# Patient Record
Sex: FEMALE | Race: WHITE | NOT HISPANIC OR LATINO | Employment: STUDENT | URBAN - METROPOLITAN AREA
[De-identification: names, ages, dates, MRNs, and addresses within clinical notes are randomized per-mention and may not be internally consistent; named-entity substitution may affect disease eponyms.]

---

## 2017-08-03 ENCOUNTER — RESOLUTE PROFESSIONAL BILLING HOSPITAL PROF FEE (OUTPATIENT)
Dept: HOSPITALIST | Facility: MEDICAL CENTER | Age: 22
End: 2017-08-03
Payer: MEDICAID

## 2017-08-03 ENCOUNTER — HOSPITAL ENCOUNTER (INPATIENT)
Facility: MEDICAL CENTER | Age: 22
LOS: 1 days | DRG: 152 | End: 2017-08-05
Attending: EMERGENCY MEDICINE | Admitting: HOSPITALIST
Payer: MEDICAID

## 2017-08-03 DIAGNOSIS — D72.829 LEUKOCYTOSIS, UNSPECIFIED TYPE: ICD-10-CM

## 2017-08-03 DIAGNOSIS — J02.9 PHARYNGITIS, UNSPECIFIED ETIOLOGY: ICD-10-CM

## 2017-08-03 DIAGNOSIS — T50.905A MEDICATION REACTION, INITIAL ENCOUNTER: ICD-10-CM

## 2017-08-03 DIAGNOSIS — B34.9 VIREMIA: ICD-10-CM

## 2017-08-03 DIAGNOSIS — F19.10 POLYSUBSTANCE ABUSE (HCC): ICD-10-CM

## 2017-08-03 DIAGNOSIS — F15.10 METHAMPHETAMINE ABUSE (HCC): ICD-10-CM

## 2017-08-03 PROBLEM — B96.89 BACTERIAL PHARYNGITIS: Status: ACTIVE | Noted: 2017-08-03

## 2017-08-03 PROBLEM — J02.8 BACTERIAL PHARYNGITIS: Status: ACTIVE | Noted: 2017-08-03

## 2017-08-03 PROBLEM — E86.0 DEHYDRATION: Status: ACTIVE | Noted: 2017-08-03

## 2017-08-03 LAB
ALBUMIN SERPL BCP-MCNC: 3.3 G/DL (ref 3.2–4.9)
ALBUMIN/GLOB SERPL: 0.8 G/DL
ALP SERPL-CCNC: 88 U/L (ref 30–99)
ALT SERPL-CCNC: 23 U/L (ref 2–50)
AMPHETAMINES UR QL: POSITIVE
ANION GAP SERPL CALC-SCNC: 10 MMOL/L (ref 0–11.9)
APPEARANCE UR: CLEAR
AST SERPL-CCNC: 17 U/L (ref 12–45)
BACTERIA #/AREA URNS HPF: ABNORMAL /HPF
BARBITURATES UR QL SCN: NEGATIVE
BASOPHILS # BLD AUTO: 0.4 % (ref 0–1.8)
BASOPHILS # BLD: 0.07 K/UL (ref 0–0.12)
BENZODIAZ UR QL SCN: NEGATIVE
BILIRUB SERPL-MCNC: 1.4 MG/DL (ref 0.1–1.5)
BILIRUB UR QL STRIP.AUTO: ABNORMAL
BUN SERPL-MCNC: 6 MG/DL (ref 8–22)
BZE UR QL SCN: NEGATIVE
CALCIUM SERPL-MCNC: 8.7 MG/DL (ref 8.4–10.2)
CASTS URNS QL MICRO: ABNORMAL /LPF
CHLORIDE SERPL-SCNC: 97 MMOL/L (ref 96–112)
CO2 SERPL-SCNC: 25 MMOL/L (ref 20–33)
COLOR UR: YELLOW
CREAT SERPL-MCNC: 0.71 MG/DL (ref 0.5–1.4)
CULTURE IF INDICATED INDCX: YES UA CULTURE
EOSINOPHIL # BLD AUTO: 0.01 K/UL (ref 0–0.51)
EOSINOPHIL NFR BLD: 0.1 % (ref 0–6.9)
EPI CELLS #/AREA URNS HPF: ABNORMAL /HPF
ERYTHROCYTE [DISTWIDTH] IN BLOOD BY AUTOMATED COUNT: 39.9 FL (ref 35.9–50)
GFR SERPL CREATININE-BSD FRML MDRD: >60 ML/MIN/1.73 M 2
GLOBULIN SER CALC-MCNC: 4 G/DL (ref 1.9–3.5)
GLUCOSE SERPL-MCNC: 101 MG/DL (ref 65–99)
GLUCOSE UR STRIP.AUTO-MCNC: 100 MG/DL
HCG UR QL: NEGATIVE
HCT VFR BLD AUTO: 43.7 % (ref 37–47)
HGB BLD-MCNC: 15.7 G/DL (ref 12–16)
IMM GRANULOCYTES # BLD AUTO: 0.09 K/UL (ref 0–0.11)
IMM GRANULOCYTES NFR BLD AUTO: 0.5 % (ref 0–0.9)
KETONES UR STRIP.AUTO-MCNC: NEGATIVE MG/DL
LACTATE BLD-SCNC: 1.49 MMOL/L (ref 0.5–2)
LEUKOCYTE ESTERASE UR QL STRIP.AUTO: ABNORMAL
LYMPHOCYTES # BLD AUTO: 1.47 K/UL (ref 1–4.8)
LYMPHOCYTES NFR BLD: 9 % (ref 22–41)
MCH RBC QN AUTO: 31.7 PG (ref 27–33)
MCHC RBC AUTO-ENTMCNC: 35.9 G/DL (ref 33.6–35)
MCV RBC AUTO: 88.3 FL (ref 81.4–97.8)
MICRO URNS: ABNORMAL
MONOCYTES # BLD AUTO: 1.55 K/UL (ref 0–0.85)
MONOCYTES NFR BLD AUTO: 9.5 % (ref 0–13.4)
MUCOUS THREADS #/AREA URNS HPF: ABNORMAL /HPF
NEUTROPHILS # BLD AUTO: 13.21 K/UL (ref 2–7.15)
NEUTROPHILS NFR BLD: 80.5 % (ref 44–72)
NITRITE UR QL STRIP.AUTO: NEGATIVE
NRBC # BLD AUTO: 0 K/UL
NRBC BLD AUTO-RTO: 0 /100 WBC
PCP UR QL SCN: NEGATIVE
PH UR STRIP.AUTO: 8 [PH]
PLATELET # BLD AUTO: 214 K/UL (ref 164–446)
PMV BLD AUTO: 9.9 FL (ref 9–12.9)
POTASSIUM SERPL-SCNC: 3.4 MMOL/L (ref 3.6–5.5)
PROT SERPL-MCNC: 7.3 G/DL (ref 6–8.2)
PROT UR QL STRIP: 100 MG/DL
RBC # BLD AUTO: 4.95 M/UL (ref 4.2–5.4)
RBC # URNS HPF: ABNORMAL /HPF
RBC UR QL AUTO: NEGATIVE
SODIUM SERPL-SCNC: 132 MMOL/L (ref 135–145)
SP GR UR REFRACTOMETRY: 1.02
SP GR UR STRIP.AUTO: 1.01
SPECIMEN DRAWN FROM PATIENT: NORMAL
UR OPIATES 2659: POSITIVE
UR THC 2511T: POSITIVE
UR TRICYCLIC 2660: NEGATIVE
WBC # BLD AUTO: 16.4 K/UL (ref 4.8–10.8)
WBC #/AREA URNS HPF: ABNORMAL /HPF

## 2017-08-03 PROCEDURE — 87077 CULTURE AEROBIC IDENTIFY: CPT

## 2017-08-03 PROCEDURE — 700105 HCHG RX REV CODE 258: Performed by: HOSPITALIST

## 2017-08-03 PROCEDURE — 700105 HCHG RX REV CODE 258

## 2017-08-03 PROCEDURE — 81001 URINALYSIS AUTO W/SCOPE: CPT | Mod: 59

## 2017-08-03 PROCEDURE — 87040 BLOOD CULTURE FOR BACTERIA: CPT | Mod: 59

## 2017-08-03 PROCEDURE — G0378 HOSPITAL OBSERVATION PER HR: HCPCS

## 2017-08-03 PROCEDURE — 83605 ASSAY OF LACTIC ACID: CPT

## 2017-08-03 PROCEDURE — 700111 HCHG RX REV CODE 636 W/ 250 OVERRIDE (IP)

## 2017-08-03 PROCEDURE — 304562 HCHG STAT O2 MASK/CANNULA

## 2017-08-03 PROCEDURE — 81025 URINE PREGNANCY TEST: CPT

## 2017-08-03 PROCEDURE — 304561 HCHG STAT O2

## 2017-08-03 PROCEDURE — 99220 PR INITIAL OBSERVATION CARE,LEVL III: CPT | Performed by: HOSPITALIST

## 2017-08-03 PROCEDURE — 700105 HCHG RX REV CODE 258: Performed by: EMERGENCY MEDICINE

## 2017-08-03 PROCEDURE — 87081 CULTURE SCREEN ONLY: CPT | Mod: 59

## 2017-08-03 PROCEDURE — 96361 HYDRATE IV INFUSION ADD-ON: CPT

## 2017-08-03 PROCEDURE — 700111 HCHG RX REV CODE 636 W/ 250 OVERRIDE (IP): Performed by: HOSPITALIST

## 2017-08-03 PROCEDURE — 87880 STREP A ASSAY W/OPTIC: CPT

## 2017-08-03 PROCEDURE — 700111 HCHG RX REV CODE 636 W/ 250 OVERRIDE (IP): Performed by: EMERGENCY MEDICINE

## 2017-08-03 PROCEDURE — 36415 COLL VENOUS BLD VENIPUNCTURE: CPT

## 2017-08-03 PROCEDURE — 85025 COMPLETE CBC W/AUTO DIFF WBC: CPT

## 2017-08-03 PROCEDURE — 87086 URINE CULTURE/COLONY COUNT: CPT

## 2017-08-03 PROCEDURE — A9270 NON-COVERED ITEM OR SERVICE: HCPCS | Performed by: HOSPITALIST

## 2017-08-03 PROCEDURE — 96374 THER/PROPH/DIAG INJ IV PUSH: CPT

## 2017-08-03 PROCEDURE — 96375 TX/PRO/DX INJ NEW DRUG ADDON: CPT

## 2017-08-03 PROCEDURE — 99285 EMERGENCY DEPT VISIT HI MDM: CPT

## 2017-08-03 PROCEDURE — 700102 HCHG RX REV CODE 250 W/ 637 OVERRIDE(OP): Performed by: HOSPITALIST

## 2017-08-03 PROCEDURE — 80305 DRUG TEST PRSMV DIR OPT OBS: CPT

## 2017-08-03 PROCEDURE — 80053 COMPREHEN METABOLIC PANEL: CPT

## 2017-08-03 PROCEDURE — 94760 N-INVAS EAR/PLS OXIMETRY 1: CPT

## 2017-08-03 RX ORDER — SODIUM CHLORIDE 9 MG/ML
1000 INJECTION, SOLUTION INTRAVENOUS ONCE
Status: COMPLETED | OUTPATIENT
Start: 2017-08-03 | End: 2017-08-03

## 2017-08-03 RX ORDER — MORPHINE SULFATE 4 MG/ML
2 INJECTION, SOLUTION INTRAMUSCULAR; INTRAVENOUS
Status: DISCONTINUED | OUTPATIENT
Start: 2017-08-03 | End: 2017-08-05 | Stop reason: HOSPADM

## 2017-08-03 RX ORDER — BISACODYL 10 MG
10 SUPPOSITORY, RECTAL RECTAL
Status: DISCONTINUED | OUTPATIENT
Start: 2017-08-03 | End: 2017-08-05 | Stop reason: HOSPADM

## 2017-08-03 RX ORDER — AMOXICILLIN 250 MG
2 CAPSULE ORAL 2 TIMES DAILY
Status: DISCONTINUED | OUTPATIENT
Start: 2017-08-03 | End: 2017-08-05 | Stop reason: HOSPADM

## 2017-08-03 RX ORDER — SODIUM CHLORIDE 9 MG/ML
INJECTION, SOLUTION INTRAVENOUS CONTINUOUS
Status: DISCONTINUED | OUTPATIENT
Start: 2017-08-03 | End: 2017-08-05 | Stop reason: HOSPADM

## 2017-08-03 RX ORDER — PROMETHAZINE HYDROCHLORIDE 25 MG/1
12.5-25 TABLET ORAL EVERY 4 HOURS PRN
Status: DISCONTINUED | OUTPATIENT
Start: 2017-08-03 | End: 2017-08-05 | Stop reason: HOSPADM

## 2017-08-03 RX ORDER — OXYCODONE HYDROCHLORIDE 5 MG/1
5 TABLET ORAL
Status: DISCONTINUED | OUTPATIENT
Start: 2017-08-03 | End: 2017-08-05 | Stop reason: HOSPADM

## 2017-08-03 RX ORDER — ACETAMINOPHEN 325 MG/1
650 TABLET ORAL EVERY 6 HOURS PRN
Status: DISCONTINUED | OUTPATIENT
Start: 2017-08-03 | End: 2017-08-05 | Stop reason: HOSPADM

## 2017-08-03 RX ORDER — POLYETHYLENE GLYCOL 3350 17 G/17G
1 POWDER, FOR SOLUTION ORAL
Status: DISCONTINUED | OUTPATIENT
Start: 2017-08-03 | End: 2017-08-05 | Stop reason: HOSPADM

## 2017-08-03 RX ORDER — ONDANSETRON 2 MG/ML
4 INJECTION INTRAMUSCULAR; INTRAVENOUS ONCE
Status: COMPLETED | OUTPATIENT
Start: 2017-08-03 | End: 2017-08-03

## 2017-08-03 RX ORDER — PROMETHAZINE HYDROCHLORIDE 25 MG/1
12.5-25 SUPPOSITORY RECTAL EVERY 4 HOURS PRN
Status: DISCONTINUED | OUTPATIENT
Start: 2017-08-03 | End: 2017-08-05 | Stop reason: HOSPADM

## 2017-08-03 RX ORDER — TEMAZEPAM 15 MG/1
15 CAPSULE ORAL
Status: DISCONTINUED | OUTPATIENT
Start: 2017-08-03 | End: 2017-08-05 | Stop reason: HOSPADM

## 2017-08-03 RX ORDER — OXYCODONE HYDROCHLORIDE 5 MG/1
2.5 TABLET ORAL
Status: DISCONTINUED | OUTPATIENT
Start: 2017-08-03 | End: 2017-08-05 | Stop reason: HOSPADM

## 2017-08-03 RX ORDER — MEDROXYPROGESTERONE ACETATE 150 MG/ML
150 INJECTION, SUSPENSION INTRAMUSCULAR ONCE
Status: DISCONTINUED | OUTPATIENT
Start: 2017-08-03 | End: 2017-08-03

## 2017-08-03 RX ORDER — ONDANSETRON 4 MG/1
4 TABLET, ORALLY DISINTEGRATING ORAL EVERY 4 HOURS PRN
Status: DISCONTINUED | OUTPATIENT
Start: 2017-08-03 | End: 2017-08-05 | Stop reason: HOSPADM

## 2017-08-03 RX ORDER — ONDANSETRON 2 MG/ML
4 INJECTION INTRAMUSCULAR; INTRAVENOUS EVERY 4 HOURS PRN
Status: DISCONTINUED | OUTPATIENT
Start: 2017-08-03 | End: 2017-08-05 | Stop reason: HOSPADM

## 2017-08-03 RX ADMIN — AMPICILLIN AND SULBACTAM 1.5 G: 1; .5 INJECTION, POWDER, FOR SOLUTION INTRAVENOUS at 22:45

## 2017-08-03 RX ADMIN — ONDANSETRON 4 MG: 2 INJECTION INTRAMUSCULAR; INTRAVENOUS at 18:47

## 2017-08-03 RX ADMIN — SODIUM CHLORIDE 1000 ML: 9 INJECTION, SOLUTION INTRAVENOUS at 18:47

## 2017-08-03 RX ADMIN — HYDROMORPHONE HYDROCHLORIDE 1 MG: 1 INJECTION, SOLUTION INTRAMUSCULAR; INTRAVENOUS; SUBCUTANEOUS at 18:48

## 2017-08-03 RX ADMIN — SODIUM CHLORIDE: 9 INJECTION, SOLUTION INTRAVENOUS at 23:11

## 2017-08-03 RX ADMIN — DOCUSATE SODIUM AND SENNOSIDES 2 TABLET: 8.6; 5 TABLET, FILM COATED ORAL at 23:11

## 2017-08-03 RX ADMIN — SODIUM CHLORIDE 1000 ML: 9 INJECTION, SOLUTION INTRAVENOUS at 20:19

## 2017-08-03 RX ADMIN — SODIUM CHLORIDE 1000 ML: 9 INJECTION, SOLUTION INTRAVENOUS at 21:20

## 2017-08-03 RX ADMIN — AMPICILLIN SODIUM AND SULBACTAM SODIUM: 1; .5 INJECTION, POWDER, FOR SOLUTION INTRAMUSCULAR; INTRAVENOUS at 23:31

## 2017-08-03 ASSESSMENT — PAIN SCALES - GENERAL
PAINLEVEL_OUTOF10: 7
PAINLEVEL_OUTOF10: 2
PAINLEVEL_OUTOF10: 5

## 2017-08-03 ASSESSMENT — LIFESTYLE VARIABLES
EVER HAD A DRINK FIRST THING IN THE MORNING TO STEADY YOUR NERVES TO GET RID OF A HANGOVER: NO
ON A TYPICAL DAY WHEN YOU DRINK ALCOHOL HOW MANY DRINKS DO YOU HAVE: 1
TOTAL SCORE: 0
HAVE YOU EVER FELT YOU SHOULD CUT DOWN ON YOUR DRINKING: NO
ALCOHOL_USE: YES
EVER FELT BAD OR GUILTY ABOUT YOUR DRINKING: NO
CONSUMPTION TOTAL: NEGATIVE
HOW MANY TIMES IN THE PAST YEAR HAVE YOU HAD 5 OR MORE DRINKS IN A DAY: 0
HAVE PEOPLE ANNOYED YOU BY CRITICIZING YOUR DRINKING: NO
AVERAGE NUMBER OF DAYS PER WEEK YOU HAVE A DRINK CONTAINING ALCOHOL: 0
EVER_SMOKED: NEVER

## 2017-08-03 NOTE — IP AVS SNAPSHOT
Lukkin Access Code: Activation code not generated  Current Lukkin Status: Active    Tr[employer]  A secure, online tool to manage your health information     [employer] Lukkin® is a secure, online tool that connects you to your personalized health information from the privacy of your home -- day or night - making it very easy for you to manage your healthcare. Once the activation process is completed, you can even access your medical information using the Lukkin milan, which is available for free in the Apple Milan store or Google Play store.     Lukkin provides the following levels of access (as shown below):   My Chart Features   [facility] Primary Care Doctor [facility]  Specialists [facility]  Urgent  Care Non-[facility]  Primary Care  Doctor   Email your healthcare team securely and privately 24/7 X X X X   Manage appointments: schedule your next appointment; view details of past/upcoming appointments X      Request prescription refills. X      View recent personal medical records, including lab and immunizations X X X X   View health record, including health history, allergies, medications X X X X   Read reports about your outpatient visits, procedures, consult and ER notes X X X X   See your discharge summary, which is a recap of your hospital and/or ER visit that includes your diagnosis, lab results, and care plan. X X       How to register for Lukkin:  1. Go to  [email or web address].  2. Click on the Sign Up Now box, which takes you to the New Member Sign Up page. You will need to provide the following information:  a. Enter your Lukkin Access Code exactly as it appears at the top of this page. (You will not need to use this code after you’ve completed the sign-up process. If you do not sign up before the expiration date, you must request a new code.)   b. Enter your date of birth.   c. Enter your home email address.   d. Click Submit, and follow the next screen’s instructions.  3. Create a Lukkin ID. This will  be your [employer] login ID and cannot be changed, so think of one that is secure and easy to remember.  4. Create a [employer] password. You can change your password at any time.  5. Enter your Password Reset Question and Answer. This can be used at a later time if you forget your password.   6. Enter your e-mail address. This allows you to receive e-mail notifications when new information is available in [employer].  7. Click Sign Up. You can now view your health information.    For assistance activating your [employer] account, call [phone]

## 2017-08-03 NOTE — IP AVS SNAPSHOT
" Home Care Instructions                                                                                                                  Name:Arabella Lobo  Medical Record Number:[id number]  CSN: [id number]    YOB: 1995   Age: 21 y.o.  Sex: female  HT:1.549 m (5' 0.98\") WT: 54.5 kg (120 lb 2.4 oz)          Admit Date: 8/3/2017     Discharge Date:   Today's Date: 8/5/2017  Attending Doctor:  Lenin Harrell M.D.                  Allergies:  Nkda            Discharge Instructions       Discharge Instructions    Discharged to home by car with relative. Discharged via wheelchair, hospital escort: Yes.  Special equipment needed: Not Applicable    Be sure to schedule a follow-up appointment with your primary care doctor or any specialists as instructed.     Discharge Plan:   Influenza Vaccine Indication: Patient Refuses    I understand that a diet low in cholesterol, fat, and sodium is recommended for good health. Unless I have been given specific instructions below for another diet, I accept this instruction as my diet prescription.   Other diet: regular    Special Instructions: None    · Is patient discharged on Warfarin / Coumadin?   No     · Is patient Post Blood Transfusion?  No    Depression / Suicide Risk    As you are discharged from this [facility] facility, it is important to learn how to keep safe from harming yourself.    Recognize the warning signs:  · Abrupt changes in personality, positive or negative- including increase in energy   · Giving away possessions  · Change in eating patterns- significant weight changes-  positive or negative  · Change in sleeping patterns- unable to sleep or sleeping all the time   · Unwillingness or inability to communicate  · Depression  · Unusual sadness, discouragement and loneliness  · Talk of wanting to die  · Neglect of personal appearance   · Rebelliousness- reckless behavior  · Withdrawal from people/activities they love  · Confusion- inability to " concentrate     If you or a loved one observes any of these behaviors or has concerns about self-harm, here's what you can do:  · Talk about it- your feelings and reasons for harming yourself  · Remove any means that you might use to hurt yourself (examples: pills, rope, extension cords, firearm)  · Get professional help from the community (Mental Health, Substance Abuse, psychological counseling)  · Do not be alone:Call your Safe Contact- someone whom you trust who will be there for you.  · Call your local CRISIS HOTLINE 852-7659 or 266-758-2624  · Call your local Children's Mobile Crisis Response Team [place] (507) 073-9519 or www.[employer]  · Call the toll free National Suicide Prevention Hotlines   · National Suicide Prevention Lifeline 994-161-XHOT (4187)  · [place] Hope Line Network 800-SUICIDE (099-1660)        Follow-up Information     1. Follow up with Jaiden Muñoz M.D.. Schedule an appointment as soon as possible for a visit in 2 weeks.    Specialty:  Family Medicine    Why:  Hospital follow-up appointment with PCP    Contact information    [address]  [address]  [facility] [address]  [phone]           Discharge Medication Instructions:    Below are the medications your physician expects you to take upon discharge:    Review all your home medications and newly ordered medications with your doctor and/or pharmacist. Follow medication instructions as directed by your doctor and/or pharmacist.    Please keep your medication list with you and share with your physician.               Medication List      START taking these medications        Instructions    Morning Afternoon Evening Bedtime    amoxicillin-clavulanate 875-125 MG Tabs   Commonly known as:  AUGMENTIN        Take 1 Tab by mouth 2 times a day for 7 days.   Dose:  1 Tab                        oxycodone immediate-release 5 MG Tabs   Last time this was given:  5 mg on 8/5/2017  1:42 AM   Commonly known as:  ROXICODONE        Take 1 Tab by  mouth every 3 hours as needed.   Dose:  5 mg                        sore throat spray 1.4 % Liqd   Last time this was given:  1 Spray on 8/4/2017  3:01 PM   Commonly known as:  CHLORASEPTIC        Spray 1 Spray in mouth/throat as needed.   Dose:  1 Spray                             Where to Get Your Medications      These medications were sent to [facility] - [place] - [address]  [address] [place] [address]     Phone:  [phone]    - amoxicillin-clavulanate 875-125 MG Tabs  - sore throat spray 1.4 % Liqd      You can get these medications from any pharmacy     Bring a paper prescription for each of these medications    - oxycodone immediate-release 5 MG Tabs            Instructions           Diet / Nutrition:    Follow any diet instructions given to you by your doctor or the dietician, including how much salt (sodium) you are allowed each day.    If you are overweight, talk to your doctor about a weight reduction plan.    Activity:    Remain physically active following your doctor's instructions about exercise and activity.    Rest often.     Any time you become even a little tired or short of breath, SIT DOWN and rest.    Worsening Symptoms:    Report any of the following signs and symptoms to the doctor's office immediately:    *Pain of jaw, arm, or neck  *Chest pain not relieved by medication                               *Dizziness or loss of consciousness  *Difficulty breathing even when at rest   *More tired than usual                                       *Bleeding drainage or swelling of surgical site  *Swelling of feet, ankles, legs or stomach                 *Fever (>100ºF)  *Pink or blood tinged sputum  *Weight gain (3lbs/day or 5lbs /week)           *Shock from internal defibrillator (if applicable)  *Palpitations or irregular heartbeats                *Cool and/or numb extremities    Stroke Awareness    Common Risk Factors for Stroke include:    Age  Atrial  Fibrillation  Carotid Artery Stenosis  Diabetes Mellitus  Excessive alcohol consumption  High blood pressure  Overweight   Physical inactivity  Smoking    Warning signs and symptoms of a stroke include:    *Sudden numbness or weakness of the face, arm or leg (especially on one side of the body).  *Sudden confusion, trouble speaking or understanding.  *Sudden trouble seeing in one or both eyes.  *Sudden trouble walking, dizziness, loss of balance or coordination.Sudden severe headache with no known cause.    It is very important to get treatment quickly when a stroke occurs. If you experience any of the above warning signs, call 911 immediately.                   Disclaimer         Quit Smoking / Tobacco Use:    I understand the use of any tobacco products increases my chance of suffering from future heart disease or stroke and could cause other illnesses which may shorten my life. Quitting the use of tobacco products is the single most important thing I can do to improve my health. For further information on smoking / tobacco cessation call a Toll Free Quit Line at 1-579.177.7820 (*[employer] [place]) or 1-962.641.4855 (American Lung Association) or you can access the web based program at www.lungusa.org.    [place] Tobacco Users Help Line:  (469) 196-9629       Toll Free: 1-924.674.3279  Quit Tobacco Program [facility] Management Services (678)163-0110    Crisis Hotline:    [place] Crisis Hotline:  3-208-NSRTCNQ or 1-898.450.2256    [place] Crisis Hotline:    1-343.862.9309 or 687-491-9501    Discharge Survey:   Thank you for choosing [facility]. We hope we did everything we could to make your hospital stay a pleasant one. You may be receiving a phone survey and we would appreciate your time and participation in answering the questions. Your input is very valuable to us in our efforts to improve our service to our patients and their families.        My signature on this form indicates that:    1. I have  reviewed and understand the above information.  2. My questions regarding this information have been answered to my satisfaction.  3. I have formulated a plan with my discharge nurse to obtain my prescribed medications for home.                  Disclaimer         __________________________________                     __________       ________                       Patient Signature                                                 Date                    Time

## 2017-08-03 NOTE — IP AVS SNAPSHOT
8/5/2017    Arabella Lobo  [address]  [place] [address]    Dear Arabella:    [facility] wants to ensure your discharge home is safe and you or your loved ones have had all of your questions answered regarding your care after you leave the hospital.    Below is a list of resources and contact information should you have any questions regarding your hospital stay, follow-up instructions, or active medical symptoms.    Questions or Concerns Regarding… Contact   Medical Questions Related to Your Discharge  (7 days a week, 8am-5pm) Contact a Nurse Care Coordinator   [phone]   Medical Questions Not Related to Your Discharge  (24 hours a day / 7 days a week)  Contact the Nurse Health Line   [phone]    Medications or Discharge Instructions Refer to your discharge packet   or contact your [facility] Primary Care Provider   [phone]   Follow-up Appointment(s) Schedule your appointment via SiCortex   or contact Scheduling [phone]   Billing Review your statement via [employer]  or contact Billing [phone]   Medical Records Review your records via [employer]   or contact Medical Records [phone]     You may receive a telephone call within two days of discharge. This call is to make certain you understand your discharge instructions and have the opportunity to have any questions answered. You can also easily access your medical information, test results and upcoming appointments via the SiC[employer] free online health management tool. You can learn more and sign up at [employer]/SiCortex. For assistance setting up your SiCortex account, please call [phone].    Once again, we want to ensure your discharge home is safe and that you have a clear understanding of any next steps in your care. If you have any questions or concerns, please do not hesitate to contact us, we are here for you. Thank you for choosing [facility] for your healthcare needs.    Sincerely,    Your [facility] Healthcare Team

## 2017-08-03 NOTE — IP AVS SNAPSHOT
" <p align=\"LEFT\"><IMG SRC=\"//[email or web address]$/[email or web address]\" alt=\"Image\" WIDTH=\"50%\" HEIGHT=\"200\" BORDER=\"\"></p>                   Name:Arabella Lobo  Medical Record Number:[id number]  CSN: [id number]    YOB: 1995   Age: 21 y.o.  Sex: female  HT:1.549 m (5' 0.98\") WT: 54.5 kg (120 lb 2.4 oz)          Admit Date: 8/3/2017     Discharge Date:   Today's Date: 8/5/2017  Attending Doctor:  Lenin Harrell M.D.                  Allergies:  Nkda          Follow-up Information     1. Follow up with Jaiden Muñoz M.D.. Schedule an appointment as soon as possible for a visit in 2 weeks.    Specialty:  Family Medicine    Why:  Hospital follow-up appointment with PCP    Contact information    [address]  [address]  [facility] [address]  [phone]           Medication List      Take these Medications        Instructions    amoxicillin-clavulanate 875-125 MG Tabs   Commonly known as:  AUGMENTIN    Take 1 Tab by mouth 2 times a day for 7 days.   Dose:  1 Tab       oxycodone immediate-release 5 MG Tabs   Commonly known as:  ROXICODONE    Take 1 Tab by mouth every 3 hours as needed.   Dose:  5 mg       sore throat spray 1.4 % Liqd   Commonly known as:  CHLORASEPTIC    Spray 1 Spray in mouth/throat as needed.   Dose:  1 Spray         "

## 2017-08-04 PROBLEM — E87.1 HYPONATREMIA: Status: ACTIVE | Noted: 2017-08-04

## 2017-08-04 PROBLEM — F31.9 BIPOLAR DISORDER (HCC): Status: ACTIVE | Noted: 2017-08-04

## 2017-08-04 PROBLEM — F60.3 BORDERLINE PERSONALITY DISORDER (HCC): Status: ACTIVE | Noted: 2017-08-04

## 2017-08-04 PROBLEM — J96.01 ACUTE RESPIRATORY FAILURE WITH HYPOXIA (HCC): Status: ACTIVE | Noted: 2017-08-04

## 2017-08-04 PROBLEM — D72.829 LEUKOCYTOSIS: Status: ACTIVE | Noted: 2017-08-04

## 2017-08-04 PROBLEM — E87.6 HYPOKALEMIA: Status: ACTIVE | Noted: 2017-08-04

## 2017-08-04 PROBLEM — F19.10 POLYSUBSTANCE ABUSE (HCC): Status: ACTIVE | Noted: 2017-08-04

## 2017-08-04 LAB
DEPRECATED S PYO AG THROAT QL EIA: NORMAL
HETEROPH AB SER QL: NEGATIVE
SIGNIFICANT IND 70042: NORMAL
SITE SITE: NORMAL
SOURCE SOURCE: NORMAL
TSH SERPL DL<=0.005 MIU/L-ACNC: 0.98 UIU/ML (ref 0.35–5.5)

## 2017-08-04 PROCEDURE — 700102 HCHG RX REV CODE 250 W/ 637 OVERRIDE(OP): Performed by: HOSPITALIST

## 2017-08-04 PROCEDURE — 84443 ASSAY THYROID STIM HORMONE: CPT

## 2017-08-04 PROCEDURE — 770001 HCHG ROOM/CARE - MED/SURG/GYN PRIV*

## 2017-08-04 PROCEDURE — 700105 HCHG RX REV CODE 258: Performed by: HOSPITALIST

## 2017-08-04 PROCEDURE — 36415 COLL VENOUS BLD VENIPUNCTURE: CPT

## 2017-08-04 PROCEDURE — A9270 NON-COVERED ITEM OR SERVICE: HCPCS | Performed by: HOSPITALIST

## 2017-08-04 PROCEDURE — 94760 N-INVAS EAR/PLS OXIMETRY 1: CPT

## 2017-08-04 PROCEDURE — 700111 HCHG RX REV CODE 636 W/ 250 OVERRIDE (IP): Performed by: HOSPITALIST

## 2017-08-04 PROCEDURE — 86308 HETEROPHILE ANTIBODY SCREEN: CPT

## 2017-08-04 PROCEDURE — 700102 HCHG RX REV CODE 250 W/ 637 OVERRIDE(OP): Performed by: INTERNAL MEDICINE

## 2017-08-04 PROCEDURE — 700111 HCHG RX REV CODE 636 W/ 250 OVERRIDE (IP): Performed by: INTERNAL MEDICINE

## 2017-08-04 PROCEDURE — 99233 SBSQ HOSP IP/OBS HIGH 50: CPT | Performed by: INTERNAL MEDICINE

## 2017-08-04 RX ORDER — POTASSIUM CHLORIDE 7.45 MG/ML
10 INJECTION INTRAVENOUS
Status: COMPLETED | OUTPATIENT
Start: 2017-08-04 | End: 2017-08-04

## 2017-08-04 RX ADMIN — ACETAMINOPHEN 650 MG: 325 TABLET, FILM COATED ORAL at 18:08

## 2017-08-04 RX ADMIN — OXYCODONE HYDROCHLORIDE 5 MG: 5 TABLET ORAL at 21:38

## 2017-08-04 RX ADMIN — OXYCODONE HYDROCHLORIDE 5 MG: 5 TABLET ORAL at 14:58

## 2017-08-04 RX ADMIN — POTASSIUM CHLORIDE 10 MEQ: 10 INJECTION, SOLUTION INTRAVENOUS at 10:41

## 2017-08-04 RX ADMIN — SODIUM CHLORIDE: 9 INJECTION, SOLUTION INTRAVENOUS at 08:05

## 2017-08-04 RX ADMIN — POTASSIUM CHLORIDE 10 MEQ: 10 INJECTION, SOLUTION INTRAVENOUS at 09:42

## 2017-08-04 RX ADMIN — AMPICILLIN AND SULBACTAM 1.5 G: 1; .5 INJECTION, POWDER, FOR SOLUTION INTRAVENOUS at 21:37

## 2017-08-04 RX ADMIN — DOCUSATE SODIUM AND SENNOSIDES 2 TABLET: 8.6; 5 TABLET, FILM COATED ORAL at 21:38

## 2017-08-04 RX ADMIN — AMPICILLIN AND SULBACTAM 1.5 G: 1; .5 INJECTION, POWDER, FOR SOLUTION INTRAVENOUS at 16:00

## 2017-08-04 RX ADMIN — POTASSIUM CHLORIDE 10 MEQ: 7.46 INJECTION, SOLUTION INTRAVENOUS at 08:33

## 2017-08-04 RX ADMIN — PHENOL 1 SPRAY: 1.5 LIQUID ORAL at 15:01

## 2017-08-04 RX ADMIN — AMPICILLIN AND SULBACTAM 1.5 G: 1; .5 INJECTION, POWDER, FOR SOLUTION INTRAVENOUS at 04:00

## 2017-08-04 RX ADMIN — AMPICILLIN AND SULBACTAM 1.5 G: 1; .5 INJECTION, POWDER, FOR SOLUTION INTRAVENOUS at 09:45

## 2017-08-04 RX ADMIN — OXYCODONE HYDROCHLORIDE 2.5 MG: 5 TABLET ORAL at 01:22

## 2017-08-04 RX ADMIN — SODIUM CHLORIDE: 9 INJECTION, SOLUTION INTRAVENOUS at 18:09

## 2017-08-04 RX ADMIN — OXYCODONE HYDROCHLORIDE 5 MG: 5 TABLET ORAL at 08:12

## 2017-08-04 ASSESSMENT — ENCOUNTER SYMPTOMS
SORE THROAT: 1
FEVER: 0
CHILLS: 0

## 2017-08-04 ASSESSMENT — PAIN SCALES - GENERAL
PAINLEVEL_OUTOF10: 6
PAINLEVEL_OUTOF10: 2
PAINLEVEL_OUTOF10: 5

## 2017-08-04 ASSESSMENT — LIFESTYLE VARIABLES: EVER_SMOKED: NEVER

## 2017-08-04 NOTE — ED NOTES
"Patient reports sore throat and body aches for last 5 days, pt reports taking Advil \"around the clock\"    "

## 2017-08-04 NOTE — ED NOTES
RN rounded on patient post dilaudid administration. Patient oxygen saturation decreased and patient aroused. Patient diaphoretic.  Saturations back in upper 90s. Oral temperature decreased from triage. Second IV started and second set of blood cultures drawn and lactic acid drawn. ERP updated on patient.

## 2017-08-04 NOTE — PROGRESS NOTES
Pt medicated x 1 for sore throat with oxycodone.  Was able to eat brkfst.  Iv infusing. on last of k-riders   Voiding w/o diff.

## 2017-08-04 NOTE — ED NOTES
ERP notified of patient blood pressure 89/55. ERP to bedside for re-evaluation. Patient stated she is feeling better.   ERP stated we would continue to monitor. Second liter of IV fluid started.

## 2017-08-04 NOTE — CARE PLAN
Problem: Knowledge Deficit  Goal: Knowledge of disease process/condition, treatment plan, diagnostic tests, and medications will improve  Outcome: PROGRESSING AS EXPECTED  Intervention: Assess knowledge level of disease process/condition, treatment plan, diagnostic tests, and medications  Discuss information regarding therapeutic regimen and document in education.  Implement medication teaching.  Pt verbalize understanding.      Problem: Pain Management  Goal: Pain level will decrease to patient’s comfort goal  Outcome: PROGRESSING AS EXPECTED  Intervention: Follow pain managment plan developed in collaboration with patient and Interdisciplinary Team  Pain assessment q4h or q2h after medication intervention.  Encourage patient to report pain, verbalize understanding. Medicate PRN

## 2017-08-04 NOTE — PROGRESS NOTES
Hospital Medicine Interval Note  Date of Service:  8/4/2017    Chief Complaint  21 y.o.-year-old female admitted 8/3/2017 with acute respiratory failure with hypoxia due to bacterial pharyngitis with associated dehydration due to poor oral intake.    Interval Problem Update  Pt c/o sore throat but tolerated oral diet with no complications.     Consultants/Specialty  NONE    Disposition  Home when medically stable     Review of Systems   Constitutional: Negative for fever and chills.   HENT: Positive for sore throat.    All other systems reviewed and are negative.     Physical Exam Laboratory/Imaging   Filed Vitals:    08/03/17 2222 08/04/17 0030 08/04/17 0500 08/04/17 0757   BP: 97/59  92/52 107/73   Pulse: 78  78 53   Temp: 36.3 °C (97.3 °F)  36.3 °C (97.4 °F) 36.6 °C (97.9 °F)   Resp: 15  16 16   Height:       Weight: 54.5 kg (120 lb 2.4 oz)      SpO2: 100% 100% 99% 100%     Physical Exam   Constitutional: She is oriented to person, place, and time. She appears well-developed and well-nourished.   HENT:   Head: Normocephalic and atraumatic.   Right Ear: External ear normal.   Left Ear: External ear normal.   Nose: Nose normal.   Oropharyngeal erythema, no exudates   Eyes: Conjunctivae and EOM are normal. Pupils are equal, round, and reactive to light. No scleral icterus.   Neck: Normal range of motion. Neck supple.   Cardiovascular: Exam reveals no gallop and no friction rub.    No murmur heard.  Pulmonary/Chest: Effort normal and breath sounds normal.   Abdominal: Soft. Bowel sounds are normal.   Musculoskeletal: Normal range of motion.   Neurological: She is alert and oriented to person, place, and time. She has normal reflexes.   Skin: Skin is warm and dry.   Nursing note and vitals reviewed.   Lab Results   Component Value Date/Time    WBC 16.4* 08/03/2017 06:10 PM    HEMOGLOBIN 15.7 08/03/2017 06:10 PM    HEMATOCRIT 43.7 08/03/2017 06:10 PM    PLATELET COUNT 214 08/03/2017 06:10 PM     Lab Results   Component  Value Date/Time    SODIUM 132* 08/03/2017 06:10 PM    POTASSIUM 3.4* 08/03/2017 06:10 PM    CHLORIDE 97 08/03/2017 06:10 PM    CO2 25 08/03/2017 06:10 PM    GLUCOSE 101* 08/03/2017 06:10 PM    BUN 6* 08/03/2017 06:10 PM    CREATININE 0.71 08/03/2017 06:10 PM      Assessment/Plan    * Bacterial pharyngitis  Assessment & Plan  - on IV Unasyn for now; prn chloraseptic  - monospot negative  - monitor for improvement    Dehydration  Assessment & Plan  - cont IVF hydration and encourage oral fluid intake    Leukocytosis  Assessment & Plan  - most likely due to pharyngitis  - cont tx as above and monitor wbcs    Hyponatremia  Assessment & Plan  - most likely due to dehydration  - cont IVFs and monitor    Hypokalemia  Assessment & Plan  - will replete as needed; monitor    Acute respiratory failure with hypoxia (CMS-HCC)  Assessment & Plan  - due to bacterial pharyngitis  - on O2 supplementation; wean as tolerated    Polysubstance abuse  Assessment & Plan  - as evidenced on UTox  - encouraged cessation and f/u with Psych    Bipolar disorder (CMS-HCC)  Assessment & Plan  - mood stable and no documented hx of medications  - will monitor; will need outpt Psych f/u       Core Measures

## 2017-08-04 NOTE — ED NOTES
Late entry 2138  Attempted to stop oxygen. Pt did not tolerate. O2 sats dropped to 87% and oxygen was reapplied.

## 2017-08-04 NOTE — ED PROVIDER NOTES
ED Provider Note    CHIEF COMPLAINT  Chief Complaint   Patient presents with   • Sore Throat   • Body Aches   • Neck Pain       HPI  Arabella Lobo is a 21 y.o. female who presents to the emergency department for evaluation of sore throat and body aches over the last 5 days. Patient notes these been taking Advil without relief. No previous ENT surgery. Patient describes associated hot flashes and pain from her throat radiating to her upper chest. Patient has no cough no hemoptysis. Patient has pain with movement. Patient has headache which is increased over the last 4 days. She describes no associated neck pain.    No abdominal pain. She describes generalized muscle aches.    Review of chart shows the patient was last evaluated for right middle lobe pneumonia and November of last year. Patient was seen for pharyngitis in March of last year. Evaluated for tonsillitis in October 2015. Previous evaluations for PID, chest wall pain and pyelonephritis.    REVIEW OF SYSTEMS  See HPI for further details. All other systems are negative.     PAST MEDICAL HISTORY  Past Medical History   Diagnosis Date   • Elevated liver enzymes    • Bipolar 1 disorder (CMS-HCC)    • Depression    • Borderline personality disorder    • Hypothyroid        FAMILY HISTORY  No significant family history    SOCIAL HISTORY  Social History     Social History   • Marital Status: Single     Spouse Name: N/A   • Number of Children: N/A   • Years of Education: N/A     Social History Main Topics   • Smoking status: Former Smoker   • Smokeless tobacco: Not on file   • Alcohol Use: No      Comment: quit 2/17/12   • Drug Use: No      Comment: quit meth, cocaine, weed 2/17/12   • Sexual Activity: Not on file     Other Topics Concern   • Not on file     Social History Narrative       SURGICAL HISTORY  No past surgical history on file.    CURRENT MEDICATIONS  Home Medications     **Home medications have not yet been reviewed for this encounter**     "      ALLERGIES  Allergies   Allergen Reactions   • Nkda [No Known Drug Allergy]        PHYSICAL EXAM  VITAL SIGNS: /67 mmHg  Pulse 117  Temp(Src) 38.1 °C (100.6 °F)  Resp 18  Ht 1.549 m (5' 0.98\")  Wt 50.5 kg (111 lb 5.3 oz)  BMI 21.05 kg/m2  SpO2 100%  Constitutional: Well developed, Well nourished, No acute distress, Non-toxic appearance. No evidence of airway compromise   HENT: Normocephalic, Atraumatic, Bilateral external ears normal, Oropharynx currently dry with evidence of dehydration, No oral exudates, Nose normal. Posterior pharynx erythematous tonsils not present no stridor  Eyes: PERRLA, EOMI, Conjunctiva normal, No discharge.   Neck: Normal range of motion, No tenderness, Supple, No stridor. No masses. No evidence of meningitis or meningismus. Able to touch chin to chest visit with feet over pelvis.  Lymphatic: No lymphadenopathy noted.   Cardiovascular: Normal heart rate, Normal rhythm, No murmurs, No rubs, No gallops.   Thorax & Lungs: Normal breath sounds, No respiratory distress, No wheezing or rhonchi, No chest tenderness.   Abdomen: Bowel sounds normal, Soft, No tenderness, No masses, No pulsatile masses. No guarding or rebound. No evidence of peritoneal findings.  Skin: Warm, Dry, No erythema, No rash. No exanthem.   Back: Mild diffuse tenderness.   Extremities:  No edema, No tenderness, No cyanosis, No clubbing.   Musculoskeletal: Good range of motion in all major joints. No major deformities noted.   Neurologic: Alert & oriented x 3, Normal motor function, No focal deficits noted.   Psychiatric: Affect normal, mood normal.                        RADIOLOGY/PROCEDURES  No orders to display         COURSE & MEDICAL DECISION MAKING  Pertinent Labs & Imaging studies reviewed. (See chart for details)  Patient is tachycardic moderately dry mucous membranes consistent with dehydration. Patient given bolus of crystalloid. Titrated with Dilaudid given Zofran for nausea. Blood cultures " obtained. Blood cultures obtained as the nail for a prolonged period of time. She does not appear septic.    Review of blood work with patient demonstrates high white count with left shift but negative lactate.    8:15 PM. Patient notes her headache is gone notes minimal neck pain feeling much better. I discussed inpatient versus outpatient management. Patient requests discharge. Patient states that she's had marked response to pain medications in the past as well.    Results for orders placed or performed during the hospital encounter of 08/03/17   CBC WITH DIFFERENTIAL   Result Value Ref Range    WBC 16.4 (H) 4.8 - 10.8 K/uL    RBC 4.95 4.20 - 5.40 M/uL    Hemoglobin 15.7 12.0 - 16.0 g/dL    Hematocrit 43.7 37.0 - 47.0 %    MCV 88.3 81.4 - 97.8 fL    MCH 31.7 27.0 - 33.0 pg    MCHC 35.9 (H) 33.6 - 35.0 g/dL    RDW 39.9 35.9 - 50.0 fL    Platelet Count 214 164 - 446 K/uL    MPV 9.9 9.0 - 12.9 fL    Neutrophils-Polys 80.50 (H) 44.00 - 72.00 %    Lymphocytes 9.00 (L) 22.00 - 41.00 %    Monocytes 9.50 0.00 - 13.40 %    Eosinophils 0.10 0.00 - 6.90 %    Basophils 0.40 0.00 - 1.80 %    Immature Granulocytes 0.50 0.00 - 0.90 %    Nucleated RBC 0.00 /100 WBC    Neutrophils (Absolute) 13.21 (H) 2.00 - 7.15 K/uL    Lymphs (Absolute) 1.47 1.00 - 4.80 K/uL    Monos (Absolute) 1.55 (H) 0.00 - 0.85 K/uL    Eos (Absolute) 0.01 0.00 - 0.51 K/uL    Baso (Absolute) 0.07 0.00 - 0.12 K/uL    Immature Granulocytes (abs) 0.09 0.00 - 0.11 K/uL    NRBC (Absolute) 0.00 K/uL   COMP METABOLIC PANEL   Result Value Ref Range    Sodium 132 (L) 135 - 145 mmol/L    Potassium 3.4 (L) 3.6 - 5.5 mmol/L    Chloride 97 96 - 112 mmol/L    Co2 25 20 - 33 mmol/L    Anion Gap 10.0 0.0 - 11.9    Glucose 101 (H) 65 - 99 mg/dL    Bun 6 (L) 8 - 22 mg/dL    Creatinine 0.71 0.50 - 1.40 mg/dL    Calcium 8.7 8.4 - 10.2 mg/dL    AST(SGOT) 17 12 - 45 U/L    ALT(SGPT) 23 2 - 50 U/L    Alkaline Phosphatase 88 30 - 99 U/L    Total Bilirubin 1.4 0.1 - 1.5 mg/dL     Albumin 3.3 3.2 - 4.9 g/dL    Total Protein 7.3 6.0 - 8.2 g/dL    Globulin 4.0 (H) 1.9 - 3.5 g/dL    A-G Ratio 0.8 g/dL   LACTIC ACID   Result Value Ref Range    Lactic Acid 1.49 0.50 - 2.00 mmol/L    Specimen Venous    ESTIMATED GFR   Result Value Ref Range    GFR If African American >60 >60 mL/min/1.73 m 2    GFR If Non African American >60 >60 mL/min/1.73 m 2   UR DRUG SCREEN(SO POND ONLY)   Result Value Ref Range    Phencyclidine -Pcp Negative Negative    Benzodiazepines Negative Negative    Cocaine Metabolite Negative Negative    Amphetamines By Triage Positive (A) Negative    Urine THC Positive (A) Negative    Codeine-Morphine Positive (A) Negative    Barbiturates Negative Negative    Tricyclic Antidepressants Negative Negative   URINALYSIS,CULTURE IF INDICATED   Result Value Ref Range    Micro Urine Req Microscopic         9:20 PM continues to appear well. After 2-1/2 L patient's only been to the bathroom when since concentrated urine. Patient still states she feels much better and wishes discharge.    Concern about repeat episodes of hypotension and brief episode of hypoxia I believe patient safer for admission for observation. Presentation reviewed with internal medicine. Disposition admission.    FINAL IMPRESSION  1. Pharyngitis, unspecified etiology    2. Leukocytosis, unspecified type    3. Medication reaction, initial encounter    4. Dehydration.  5. Polysubstance abuse  6. Methamphetamine abuse      Electronically signed by: David Adams, 8/3/2017 5:56 PM

## 2017-08-04 NOTE — H&P
CHIEF COMPLAINT:  Fever, chills, sore throat and dehydration.    HISTORY OF PRESENT ILLNESS:  This 21-year-old female who reports symptoms that   began about 4-5 days ago.  She notes a sore throat, difficulty swallowing,   persistent headache, fevers and chills and body aches.  Her symptoms have   stayed about the same since onset and she has had increasing difficulty   drinking and keeping up with her fluids.  She does not have a cough and has   not noticed any shortness of breath.    REVIEW OF SYSTEMS:  Positive as noted above, otherwise all systems are   reviewed and negative.    PREVIOUS MEDICAL HISTORY:  1.  Bipolar disorder.  2.  Borderline personality disorder.  3.  Hypothyroidism.    MEDICATIONS:  1.  Depo-Provera.  2.  Zofran 4 mg q. 4 hours p.r.n. nausea.    PAST SURGICAL HISTORY:  None.    ALLERGIES:  No known drug allergies.    SOCIAL HISTORY:  The patient quit smoking several years ago.  She drinks   alcohol only rarely.  She used to use methamphetamines and cocaine, but quit   those in 2012.    FAMILY HISTORY:  Reviewed but not relevant to presentation.    PHYSICAL EXAMINATION:  VITAL SIGNS:  T-max 37.2, heart rate 95, respiratory rate 15, /67,   satting in the upper 90s on 2 L nasal cannula.  GENERAL:  The patient is awake, alert.  She is in no acute distress.  HEENT:  Head is normocephalic and atraumatic.  Mucous membranes are dry.  The   tonsillar pillars are inflamed.  There is some exudate.  The remainder of the   oropharynx is benign.  NECK:  Positive for lymphadenopathy in the anterior and posterior chains.    Supple.  No meningeal findings.  RESPIRATORY:  Clear to auscultation bilaterally.  CARDIAC:  Relative tachycardia with a rate around 90.  No murmurs, rubs or   clicks.  ABDOMEN:  Soft, no guarding, rebound, hepatosplenomegaly or masses.  EXTREMITIES:  No clubbing, cyanosis or edema.  SKIN:  Warm and dry.  No rashes are appreciated.  NEUROLOGIC:  Alert and oriented.  Speech clear,  content logical.    LABORATORY DATA:  White count 16.4, hemoglobin 15.7, platelet count 214.    Sodium 132, potassium 3.4, BUN 6, creatinine 0.71.  LFTs are within normal   limits.  Lactic acid 1.49.  Beta hCG is pending at the time of this dictation.    ASSESSMENT AND PLAN:  This 21-year-old female who carries old clinical   findings consistent with bacterial pharyngitis.  We will start the patient on   Unasyn, continue to fluid resuscitate her as she does appear quite dry and   treat her supportively.  There is no evidence of retropharyngeal abscess or   airway compromise.  1.  Pregnancy status:  Unknown if beta hCG has been ordered.  2.  Borderline personality disorder.  3.  Bipolar disorder.  4.  History of hypothyroidism:  The patient is currently not on any   replacement, we will check TSH.  5.  Dehydration:  The patient is having difficulty keeping up with p.o.   intake.  We will treat her supportively and continue fluids IV.       ____________________________________     DO JOSE MIGUEL Cheng / GREGG    DD:  08/03/2017 21:56:26  DT:  08/03/2017 22:09:00    D#:  [id number]  Job#:  [id number]

## 2017-08-05 VITALS
OXYGEN SATURATION: 96 % | BODY MASS INDEX: 22.68 KG/M2 | HEIGHT: 61 IN | HEART RATE: 73 BPM | RESPIRATION RATE: 16 BRPM | SYSTOLIC BLOOD PRESSURE: 110 MMHG | DIASTOLIC BLOOD PRESSURE: 63 MMHG | WEIGHT: 120.15 LBS | TEMPERATURE: 98.5 F

## 2017-08-05 PROBLEM — E86.0 DEHYDRATION: Status: RESOLVED | Noted: 2017-08-03 | Resolved: 2017-08-05

## 2017-08-05 PROBLEM — E87.1 HYPONATREMIA: Status: RESOLVED | Noted: 2017-08-04 | Resolved: 2017-08-05

## 2017-08-05 PROBLEM — J96.01 ACUTE RESPIRATORY FAILURE WITH HYPOXIA (HCC): Status: RESOLVED | Noted: 2017-08-04 | Resolved: 2017-08-05

## 2017-08-05 PROBLEM — D72.829 LEUKOCYTOSIS: Status: RESOLVED | Noted: 2017-08-04 | Resolved: 2017-08-05

## 2017-08-05 LAB
ANION GAP SERPL CALC-SCNC: 6 MMOL/L (ref 0–11.9)
BASOPHILS # BLD AUTO: 0.4 % (ref 0–1.8)
BASOPHILS # BLD: 0.05 K/UL (ref 0–0.12)
BUN SERPL-MCNC: <5 MG/DL (ref 8–22)
CALCIUM SERPL-MCNC: 7.8 MG/DL (ref 8.4–10.2)
CHLORIDE SERPL-SCNC: 103 MMOL/L (ref 96–112)
CO2 SERPL-SCNC: 25 MMOL/L (ref 20–33)
CREAT SERPL-MCNC: 0.51 MG/DL (ref 0.5–1.4)
EOSINOPHIL # BLD AUTO: 0.1 K/UL (ref 0–0.51)
EOSINOPHIL NFR BLD: 0.9 % (ref 0–6.9)
ERYTHROCYTE [DISTWIDTH] IN BLOOD BY AUTOMATED COUNT: 41.9 FL (ref 35.9–50)
GFR SERPL CREATININE-BSD FRML MDRD: >60 ML/MIN/1.73 M 2
GLUCOSE SERPL-MCNC: 102 MG/DL (ref 65–99)
HCT VFR BLD AUTO: 37.2 % (ref 37–47)
HGB BLD-MCNC: 12.9 G/DL (ref 12–16)
IMM GRANULOCYTES # BLD AUTO: 0.06 K/UL (ref 0–0.11)
IMM GRANULOCYTES NFR BLD AUTO: 0.5 % (ref 0–0.9)
LYMPHOCYTES # BLD AUTO: 1.45 K/UL (ref 1–4.8)
LYMPHOCYTES NFR BLD: 12.5 % (ref 22–41)
MCH RBC QN AUTO: 31.4 PG (ref 27–33)
MCHC RBC AUTO-ENTMCNC: 34.7 G/DL (ref 33.6–35)
MCV RBC AUTO: 90.5 FL (ref 81.4–97.8)
MONOCYTES # BLD AUTO: 1.14 K/UL (ref 0–0.85)
MONOCYTES NFR BLD AUTO: 9.8 % (ref 0–13.4)
NEUTROPHILS # BLD AUTO: 8.82 K/UL (ref 2–7.15)
NEUTROPHILS NFR BLD: 75.9 % (ref 44–72)
NRBC # BLD AUTO: 0 K/UL
NRBC BLD AUTO-RTO: 0 /100 WBC
PLATELET # BLD AUTO: 203 K/UL (ref 164–446)
PMV BLD AUTO: 9.7 FL (ref 9–12.9)
POTASSIUM SERPL-SCNC: 3.3 MMOL/L (ref 3.6–5.5)
RBC # BLD AUTO: 4.11 M/UL (ref 4.2–5.4)
SODIUM SERPL-SCNC: 134 MMOL/L (ref 135–145)
WBC # BLD AUTO: 11.6 K/UL (ref 4.8–10.8)

## 2017-08-05 PROCEDURE — A9270 NON-COVERED ITEM OR SERVICE: HCPCS | Performed by: HOSPITALIST

## 2017-08-05 PROCEDURE — 700111 HCHG RX REV CODE 636 W/ 250 OVERRIDE (IP): Performed by: INTERNAL MEDICINE

## 2017-08-05 PROCEDURE — A9270 NON-COVERED ITEM OR SERVICE: HCPCS | Performed by: INTERNAL MEDICINE

## 2017-08-05 PROCEDURE — 700102 HCHG RX REV CODE 250 W/ 637 OVERRIDE(OP): Performed by: HOSPITALIST

## 2017-08-05 PROCEDURE — 85025 COMPLETE CBC W/AUTO DIFF WBC: CPT

## 2017-08-05 PROCEDURE — 700102 HCHG RX REV CODE 250 W/ 637 OVERRIDE(OP): Performed by: INTERNAL MEDICINE

## 2017-08-05 PROCEDURE — 80048 BASIC METABOLIC PNL TOTAL CA: CPT

## 2017-08-05 PROCEDURE — 36415 COLL VENOUS BLD VENIPUNCTURE: CPT

## 2017-08-05 PROCEDURE — 99239 HOSP IP/OBS DSCHRG MGMT >30: CPT | Performed by: INTERNAL MEDICINE

## 2017-08-05 PROCEDURE — 700105 HCHG RX REV CODE 258: Performed by: HOSPITALIST

## 2017-08-05 PROCEDURE — 700111 HCHG RX REV CODE 636 W/ 250 OVERRIDE (IP): Performed by: HOSPITALIST

## 2017-08-05 RX ORDER — MAGNESIUM SULFATE HEPTAHYDRATE 40 MG/ML
2 INJECTION, SOLUTION INTRAVENOUS ONCE
Status: DISCONTINUED | OUTPATIENT
Start: 2017-08-05 | End: 2017-08-05

## 2017-08-05 RX ORDER — AMOXICILLIN AND CLAVULANATE POTASSIUM 875; 125 MG/1; MG/1
1 TABLET, FILM COATED ORAL 2 TIMES DAILY
Qty: 14 TAB | Refills: 0 | Status: SHIPPED | OUTPATIENT
Start: 2017-08-05 | End: 2017-08-12

## 2017-08-05 RX ORDER — MAGNESIUM SULFATE HEPTAHYDRATE 40 MG/ML
2 INJECTION, SOLUTION INTRAVENOUS ONCE
Status: COMPLETED | OUTPATIENT
Start: 2017-08-05 | End: 2017-08-05

## 2017-08-05 RX ORDER — POTASSIUM CHLORIDE 20 MEQ/1
40 TABLET, EXTENDED RELEASE ORAL DAILY
Status: DISCONTINUED | OUTPATIENT
Start: 2017-08-05 | End: 2017-08-05 | Stop reason: HOSPADM

## 2017-08-05 RX ORDER — OXYCODONE HYDROCHLORIDE 5 MG/1
5 TABLET ORAL
Qty: 30 TAB | Refills: 0 | Status: SHIPPED | OUTPATIENT
Start: 2017-08-05

## 2017-08-05 RX ADMIN — ACETAMINOPHEN 650 MG: 325 TABLET, FILM COATED ORAL at 01:42

## 2017-08-05 RX ADMIN — MORPHINE SULFATE 2 MG: 4 INJECTION INTRAVENOUS at 10:28

## 2017-08-05 RX ADMIN — MAGNESIUM SULFATE HEPTAHYDRATE 2 G: 40 INJECTION, SOLUTION INTRAVENOUS at 11:00

## 2017-08-05 RX ADMIN — AMPICILLIN AND SULBACTAM 1.5 G: 1; .5 INJECTION, POWDER, FOR SOLUTION INTRAVENOUS at 10:24

## 2017-08-05 RX ADMIN — POTASSIUM CHLORIDE 40 MEQ: 1500 TABLET, EXTENDED RELEASE ORAL at 10:25

## 2017-08-05 RX ADMIN — OXYCODONE HYDROCHLORIDE 5 MG: 5 TABLET ORAL at 01:42

## 2017-08-05 RX ADMIN — AMPICILLIN AND SULBACTAM 1.5 G: 1; .5 INJECTION, POWDER, FOR SOLUTION INTRAVENOUS at 03:38

## 2017-08-05 RX ADMIN — MORPHINE SULFATE 2 MG: 4 INJECTION INTRAVENOUS at 03:41

## 2017-08-05 RX ADMIN — SODIUM CHLORIDE: 9 INJECTION, SOLUTION INTRAVENOUS at 02:46

## 2017-08-05 RX ADMIN — OXYCODONE HYDROCHLORIDE 5 MG: 5 TABLET ORAL at 14:10

## 2017-08-05 RX ADMIN — TEMAZEPAM 15 MG: 15 CAPSULE ORAL at 02:38

## 2017-08-05 ASSESSMENT — PAIN SCALES - GENERAL
PAINLEVEL_OUTOF10: 8

## 2017-08-05 NOTE — DISCHARGE INSTRUCTIONS
Discharge Instructions    Discharged to home by car with relative. Discharged via wheelchair, hospital escort: Yes.  Special equipment needed: Not Applicable    Be sure to schedule a follow-up appointment with your primary care doctor or any specialists as instructed.     Discharge Plan:   Influenza Vaccine Indication: Patient Refuses    I understand that a diet low in cholesterol, fat, and sodium is recommended for good health. Unless I have been given specific instructions below for another diet, I accept this instruction as my diet prescription.   Other diet: regular    Special Instructions: None    · Is patient discharged on Warfarin / Coumadin?   No     · Is patient Post Blood Transfusion?  No    Depression / Suicide Risk    As you are discharged from this [facility] facility, it is important to learn how to keep safe from harming yourself.    Recognize the warning signs:  · Abrupt changes in personality, positive or negative- including increase in energy   · Giving away possessions  · Change in eating patterns- significant weight changes-  positive or negative  · Change in sleeping patterns- unable to sleep or sleeping all the time   · Unwillingness or inability to communicate  · Depression  · Unusual sadness, discouragement and loneliness  · Talk of wanting to die  · Neglect of personal appearance   · Rebelliousness- reckless behavior  · Withdrawal from people/activities they love  · Confusion- inability to concentrate     If you or a loved one observes any of these behaviors or has concerns about self-harm, here's what you can do:  · Talk about it- your feelings and reasons for harming yourself  · Remove any means that you might use to hurt yourself (examples: pills, rope, extension cords, firearm)  · Get professional help from the community (Mental Health, Substance Abuse, psychological counseling)  · Do not be alone:Call your Safe Contact- someone whom you trust who will be there for you.  · Call your  local CRISIS HOTLINE 808-9319 or 364-028-1365  · Call your local Children's Mobile Crisis Response Team [place] (697) 246-6618 or www.[employer]  · Call the toll free National Suicide Prevention Hotlines   · National Suicide Prevention Lifeline 609-598-SRHT (6372)  · National [employer] 800-SUICIDE (691-9348)

## 2017-08-05 NOTE — DISCHARGE SUMMARY
DISCHARGE SUMMARY     ADMIT DATE:  8/3/2017         DISCHARGE DATE:  8/5/2017    PATIENT ID:  Name: Arabella Lobo   YOB: 1995  Age: 21 y.o. female   MRN: [id number]  Address: [address] [place] [address] [place] [address]  Phone: [phone] (home)    DISCHARGE DIAGNOSIS:  Viral Pharyngitis  Acute Respiratory Failure with Hypoxia  Dehydration  Leukocytosis  Hyponatremia  Hypokalemia  Bipolar Disorder  Polysubstance Abuse    CONSULTANTS:  NONE    CONDITION:Stable    DISPOSITION:Home    DIET: Regular    ACTIVITY: As tolerated     HPI/HOSPITAL COURSE:  Please see H&P for details regarding initial hospital presentation.  In summary, 22yo F with PMHx of Bipolar disorder was admitted for pharyngitis.  Upon admission, patient was also found to be hyponatremic, hypokalemic with leukocytosis.  Patient was started on IVFs while cultures were obtained.  Throat culture was negative for bacterial etiology and monospot was negative.  She was started on IV Unasyn in meantime and given chloraseptic spray to aid in sore throat relief.  Patient tolerated oral diet with treatment with no complications.  Potassium was being replaced orally.  Sodium levels improved with IVFs.  Leukocytosis resolved with treatment.  Patient was found to have positive drug screen (amphetamines ) and therefore counseled on the importance of cessation.  Patient will need to establish care with PCP and follow up in one week.  She will be discharged on oral antibiotics to complete course as well as chloraseptic with pain medication.  Patient agreed with the plan.      Physical exam:  Filed Vitals:    08/04/17 1400 08/04/17 2000 08/05/17 0200 08/05/17 0700   BP: 121/70 114/68 103/57 110/63   Pulse: 68 96 97 73   Temp: 36.9 °C (98.5 °F) 36.9 °C (98.4 °F) 37.7 °C (99.8 °F) 36.9 °C (98.5 °F)   Resp: 16 20 18 16   Height:       Weight:       SpO2: 100% 99% 94% 96%     Weight/BMI: Body mass index is 22.71 kg/(m^2).  Pulse Oximetry: 96 %, O2 (LPM): 2, O2 Delivery:  Nasal Cannula     Constitutional: She is oriented to person, place, and time. She appears well-developed and well-nourished.   HENT:    Head: Normocephalic and atraumatic.   Right Ear: External ear normal.   Left Ear: External ear normal.    Nose: Nose normal.   Oropharyngeal erythema, no exudates   Eyes: Conjunctivae and EOM are normal. Pupils are equal, round, and reactive to light. No scleral icterus.   Neck: Normal range of motion. Neck supple.   Cardiovascular: Exam reveals no gallop and no friction rub.     No murmur heard.  Pulmonary/Chest: Effort normal and breath sounds normal.   Abdominal: Soft. Bowel sounds are normal.   Musculoskeletal: Normal range of motion.   Neurological: She is alert and oriented to person, place, and time. She has normal reflexes.   Skin: Skin is warm and dry.     PROCEDURES:  NONE    RADIOLOGY:  No orders to display       DISCHARGE LABS:    Recent Labs      08/03/17 1810 08/05/17   0507   WBC  16.4*  11.6*   RBC  4.95  4.11*   HEMOGLOBIN  15.7  12.9   HEMATOCRIT  43.7  37.2   MCV  88.3  90.5   MCH  31.7  31.4   RDW  39.9  41.9   PLATELETCT  214  203   MPV  9.9  9.7   NEUTSPOLYS  80.50*  75.90*   LYMPHOCYTES  9.00*  12.50*   MONOCYTES  9.50  9.80   EOSINOPHILS  0.10  0.90   BASOPHILS  0.40  0.40     No results found for: [id number], FOLATE, FERRITIN, IRON, TOTIRONBC    Estimated GFR/CRCL = Estimated Creatinine Clearance: 131.7 mL/min (by C-G formula based on Cr of 0.51).  Recent Labs      08/03/17 1810 08/05/17   0507   SODIUM  132*  134*   POTASSIUM  3.4*  3.3*   CHLORIDE  97  103   CO2  25  25   BUN  6*  <5*   CREATININE  0.71  0.51   CALCIUM  8.7  7.8*   ALBUMIN  3.3   --        Recent Labs      08/03/17 1810   ALTSGPT  23   ASTSGOT  17   ALKPHOSPHAT  88   TBILIRUBIN  1.4   ALBUMIN  3.3   GLOBULIN  4.0*       @PNLABRCNT(GLUCOSE:3,POCGLUCOSE:3)  )No results found for: HBA1C, TSH, FREET4, FREET3, CORTISOL    DISCHARGE MEDICATIONS:  (X)  Medication Reconciliation  Completed   Arabella Lobo   Home Medication Instructions KESHA:[id number]    Printed on:08/05/17 3401   Medication Information                      amoxicillin-clavulanate (AUGMENTIN) 875-125 MG Tab  Take 1 Tab by mouth 2 times a day for 7 days.             oxycodone immediate-release (ROXICODONE) 5 MG Tab  Take 1 Tab by mouth every 3 hours as needed.             sore throat spray (CHLORASEPTIC) 1.4 % Liquid  Spray 1 Spray in mouth/throat as needed.                 INSTRUCTIONS:   The patient was instructed to return to the ER in the event of worsening symptoms. I have counseled the patient on the importance of compliance and the patient has agreed to proceed with all medical recommendations and follow up plan indicated above.   The patient understands that all medications come with benefits and risks. Risks may include permanent injury or death and these risks can be minimized with close reassessment and monitoring.        Follow up appointment details :     F/U with new PCP in one week    PENDING STUDIES:  NONE      Time spent on discharge day patient visit, preparing discharge paperwork and arranging for patient follow up 42 mins.

## 2017-08-05 NOTE — PROGRESS NOTES
Received report from day shift nurse. Assumed pt care at 1915. Pt is A&Ox4, resting comfortably in bed. Pt on 2L of oxygen via nasal cannula. No signs of SOB/respiratory distress noted. Labs noted, VSS. Pt c/o no pain at this moment. Will returns for night time meds and assessment. Fall precautions in place. Bed locked. Bed at lowest position. Call light and personal belongings within reach. Encouraged pt to call for any assistance. Continue to monitor

## 2017-08-05 NOTE — DISCHARGE PLANNING
Medical Social Work    Referral: Pt discussed at IDT rounds this AM.    Intervention: Per flowsheet, pt lives with parents and expects to d.c home. Pt probable d.c home.   No SS needs identified nor any requests for GINO Sims during rounds.      Plan: GINO available for any assistance with d.c planning.    Care Transition Team Assessment    Information Source  Orientation : Oriented x 4  Information Given By: Patient    Readmission Evaluation  Is this a readmission?: No    Elopement Risk  Legal Hold: No  Ambulatory or Self Mobile in Wheelchair: Yes  Disoriented: No  Psychiatric Symptoms: None  History of Wandering: No  Elopement this Admit: No  Vocalizing Wanting to Leave: No  Displays Behaviors, Body Language Wanting to Leave: No-Not at Risk for Elopement  Elopement Risk: Not at Risk for Elopement    Interdisciplinary Discharge Planning  Does Admitting Nurse Feel This Could be a Complex Discharge?: No  Support Systems: Parent  Mobility Issues: No    Discharge Preparedness  What is your plan after discharge?: Home with help  What are your discharge supports?: Parent         Finances  Prescription Coverage: Yes    Vision / Hearing Impairment  Vision Impairment : Yes  Right Eye Vision: Wears Glasses  Left Eye Vision: Wears Glasses  Hearing Impairment : No         Advance Directive  Advance Directive?: None    Domestic Abuse  Have you ever been the victim of abuse or violence?: No  Physical Abuse or Sexual Abuse: No  Verbal Abuse or Emotional Abuse: No  Possible Abuse Reported to:: Not Applicable    Psychological Assessment  History of Substance Abuse: Methamphetamine, Cocaine  Date Last Used - Cocaine: 2012  Date Last Used - Methamphetamine: 2012         Anticipated Discharge Information  Anticipated discharge disposition: Home

## 2017-08-05 NOTE — PROGRESS NOTES
Pt d'cd to home.  Went over all d/c instructions,scripts and follow-up d/c instructions with pt.  All questions answerd.  Medicated with oxycodone prior to d/c

## 2017-08-05 NOTE — CARE PLAN
Problem: Safety  Goal: Will remain free from injury  Outcome: PROGRESSING AS EXPECTED  Pt is injury-free at this moment    Fall precautions in place. Bed locked. Bed at lowest position    Call light & personal belongings within reach  Encouraged pt to call for any assistance  [place] rounding in progress     Problem: Pain Management  Goal: Pain level will decrease to patient’s comfort goal  Outcome: PROGRESSING AS EXPECTED  Pt c/o pain to head. Given Tylenol, Oxycodone & Morphine per MAR.  Offered ice pack, however pt reports that it doesn't help much.  Continue to medicate around the clock to help keep pain under control.

## 2017-08-06 LAB
BACTERIA UR CULT: NORMAL
S PYO SPEC QL CULT: ABNORMAL
S PYO SPEC QL CULT: ABNORMAL
SIGNIFICANT IND 70042: ABNORMAL
SIGNIFICANT IND 70042: NORMAL
SITE SITE: ABNORMAL
SITE SITE: NORMAL
SOURCE SOURCE: ABNORMAL
SOURCE SOURCE: NORMAL

## 2017-08-08 LAB
BACTERIA BLD CULT: NORMAL
BACTERIA BLD CULT: NORMAL
SIGNIFICANT IND 70042: NORMAL
SIGNIFICANT IND 70042: NORMAL
SITE SITE: NORMAL
SITE SITE: NORMAL
SOURCE SOURCE: NORMAL
SOURCE SOURCE: NORMAL

## 2017-10-17 ENCOUNTER — HOSPITAL ENCOUNTER (EMERGENCY)
Facility: MEDICAL CENTER | Age: 22
End: 2017-10-17
Payer: MEDICAID

## 2017-10-17 VITALS
TEMPERATURE: 98.4 F | DIASTOLIC BLOOD PRESSURE: 69 MMHG | BODY MASS INDEX: 22.39 KG/M2 | OXYGEN SATURATION: 98 % | RESPIRATION RATE: 16 BRPM | HEIGHT: 61 IN | HEART RATE: 114 BPM | WEIGHT: 118.61 LBS | SYSTOLIC BLOOD PRESSURE: 101 MMHG

## 2017-10-17 PROCEDURE — 302449 STATCHG TRIAGE ONLY (STATISTIC)

## 2017-10-17 ASSESSMENT — PAIN SCALES - GENERAL: PAINLEVEL_OUTOF10: 7

## 2017-10-17 NOTE — ED NOTES
"Chief Complaint   Patient presents with   • Headache     x 3 days   • Sore Throat   • Body Aches     x 3 days     /69   Pulse (!) 114   Temp 36.9 °C (98.4 °F)   Resp 16   Ht 1.549 m (5' 1\")   Wt 53.8 kg (118 lb 9.7 oz)   SpO2 98%   BMI 22.41 kg/m²     "